# Patient Record
Sex: FEMALE | Race: WHITE | Employment: UNEMPLOYED | URBAN - METROPOLITAN AREA
[De-identification: names, ages, dates, MRNs, and addresses within clinical notes are randomized per-mention and may not be internally consistent; named-entity substitution may affect disease eponyms.]

---

## 2018-11-27 ENCOUNTER — PRE-PROCEDURE TELEPHONE (OUTPATIENT)
Dept: RADIATION ONCOLOGY | Age: 73
End: 2018-11-27

## 2018-12-07 ENCOUNTER — PRE-PROCEDURE TELEPHONE (OUTPATIENT)
Dept: RADIATION ONCOLOGY | Age: 73
End: 2018-12-07

## 2018-12-07 ENCOUNTER — HOSPITAL ENCOUNTER (OUTPATIENT)
Dept: MRI IMAGING | Age: 73
Discharge: HOME | End: 2018-12-07
Payer: MEDICARE

## 2018-12-07 ENCOUNTER — HOSPITAL ENCOUNTER (OUTPATIENT)
Dept: RADIATION ONCOLOGY | Age: 73
Discharge: HOME | End: 2018-12-07
Payer: MEDICARE

## 2018-12-07 ENCOUNTER — HOSPITAL ENCOUNTER (OUTPATIENT)
Dept: CT IMAGING | Age: 73
Discharge: HOME | End: 2018-12-07
Payer: MEDICARE

## 2018-12-07 DIAGNOSIS — D32.9 MENINGIOMA (HCC): ICD-10-CM

## 2018-12-07 PROCEDURE — 70470 CT HEAD/BRAIN W/O & W/DYE: CPT

## 2018-12-07 PROCEDURE — A9576 INJ PROHANCE MULTIPACK: HCPCS | Performed by: INTERNAL MEDICINE

## 2018-12-07 PROCEDURE — 70553 MRI BRAIN STEM W/O & W/DYE: CPT

## 2018-12-07 PROCEDURE — 77290 THER RAD SIMULAJ FIELD CPLX: CPT

## 2018-12-07 PROCEDURE — 6360000004 HC RX CONTRAST MEDICATION: Performed by: NEUROLOGICAL SURGERY

## 2018-12-07 PROCEDURE — 77334 RADIATION TREATMENT AID(S): CPT

## 2018-12-07 PROCEDURE — 6360000004 HC RX CONTRAST MEDICATION: Performed by: INTERNAL MEDICINE

## 2018-12-07 RX ADMIN — IOPAMIDOL 80 ML: 755 INJECTION, SOLUTION INTRAVENOUS at 11:22

## 2018-12-07 RX ADMIN — GADOTERIDOL 20 ML: 279.3 INJECTION, SOLUTION INTRAVENOUS at 12:02

## 2018-12-10 ENCOUNTER — HOSPITAL ENCOUNTER (OUTPATIENT)
Dept: MRI IMAGING | Age: 73
Discharge: HOME | End: 2018-12-10
Payer: MEDICARE

## 2018-12-10 ENCOUNTER — APPOINTMENT (OUTPATIENT)
Dept: MRI IMAGING | Age: 73
End: 2018-12-10
Payer: MEDICARE

## 2018-12-10 DIAGNOSIS — D32.9 MENINGIOMA (HCC): ICD-10-CM

## 2018-12-10 PROCEDURE — A9579 GAD-BASE MR CONTRAST NOS,1ML: HCPCS | Performed by: NEUROLOGICAL SURGERY

## 2018-12-10 PROCEDURE — 70553 MRI BRAIN STEM W/O & W/DYE: CPT

## 2018-12-10 PROCEDURE — 6360000004 HC RX CONTRAST MEDICATION: Performed by: NEUROLOGICAL SURGERY

## 2018-12-10 RX ADMIN — GADOTERIDOL 20 ML: 279.3 INJECTION, SOLUTION INTRAVENOUS at 08:40

## 2018-12-11 ENCOUNTER — HOSPITAL ENCOUNTER (OUTPATIENT)
Dept: RADIATION ONCOLOGY | Age: 73
Discharge: HOME | End: 2018-12-11
Payer: MEDICARE

## 2018-12-11 PROCEDURE — 77373 STRTCTC BDY RAD THER TX DLVR: CPT

## 2018-12-11 NOTE — ONCOLOGY
Fractionated SRS treatment note on the gamma knife machine. Orren Leyden  12/11/18       Patient presents for her first  of 5 treatments on the gamma knife machine for her right parasagital meningioma. She was taken to the gamma knife machine, and her mask was fitted to the machine. Patient confirmation and identification was confirmed using 2 means and a timeout procedure was also done prior to the procedure.     Cone beam CT scan was performed. These images were reviewed in detailed and fused upon her stereotactic reference images. Isodose lines were also reviewed. These were then accepted by myself and our physicist.     Fraction Number: 1  Dose delivered: 5 Gy  Total dose delivered: 5Gy  Planned total dose:  30Gy     The patient acutely tolerated the procedure well. Mask was removed. @ will follow-up when the next appointment has been scheduled. I was present throughout the procedure. Satya Wang MD     Plan reviewed prior to treatment today. I met with pt prior  to procedure. She was pretreated with Ativan 1 mg at home prior to [facility]. On the way here, she had some vague numbness of her left lower extremity. No motor seizure activity, We talked about increasing her decadron (currently 2 mg pobid) and she refuses. Tolerated procedure well but we did stop tx due to some positioning issues.

## 2018-12-11 NOTE — OP NOTE
to confirm set-up accuracy. The patient then underwent stereotactic radiosurgery treatment delivery. Intra-fraction movement was monitored by infrared throughout the procedure. Target Size (cm) Shape Shots Parameters Dose (Gy) Isodose (%)   Rt parietal 3.61 Oval 28 5 Gy x 5 25 50     The patient tolerated the procedure without difficulty. The patient was instructed on medications and the need for follow-up in two weeks. TOTAL TIME SPENT WITH PATIENT:  In conformance with CMS regulations, I affirm that I was present for the entire neurosurgical portion of the procedure including target identification and contouring, development of the treatment plan, patient positioning and verification, and treatment delivery.      Ken Farr MD

## 2018-12-12 ENCOUNTER — HOSPITAL ENCOUNTER (OUTPATIENT)
Dept: RADIATION ONCOLOGY | Age: 73
Discharge: HOME | End: 2018-12-12
Payer: MEDICARE

## 2018-12-12 PROCEDURE — 77373 STRTCTC BDY RAD THER TX DLVR: CPT

## 2018-12-14 ENCOUNTER — HOSPITAL ENCOUNTER (OUTPATIENT)
Dept: RADIATION ONCOLOGY | Age: 73
Discharge: HOME | End: 2018-12-14
Payer: MEDICARE

## 2018-12-14 PROCEDURE — 77373 STRTCTC BDY RAD THER TX DLVR: CPT

## 2018-12-14 NOTE — ONCOLOGY
Fractionated SRS treatment note on the gamma knife machine. Rick Salguero  12/14/18       Patient presents for her 4th of 5 treatments on the gamma knife machine for her meningioma    She was taken to the gamma knife machine, and her mask was fitted to the machine. Patient confirmation and identification was confirmed using 2 means and a timeout procedure was also done prior to the procedure.     Cone beam CT scan was performed. These images were reviewed in detailed and fused upon her stereotactic reference images. Isodose lines were also reviewed. These were then accepted by myself and our physicist.     Fraction Number: 4  Dose delivered: 5 Gy  Total dose delivered: 20Gy  Planned total dose:  25 Gy     The patient acutely tolerated the procedure well. Mask was removed. @ will follow-up when the next appointment has been scheduled. I was present throughout the procedure.     Annie Martinez MD

## 2018-12-17 ENCOUNTER — HOSPITAL ENCOUNTER (OUTPATIENT)
Dept: RADIATION ONCOLOGY | Age: 73
Discharge: HOME | End: 2018-12-17
Payer: MEDICARE

## 2018-12-17 PROCEDURE — 77336 RADIATION PHYSICS CONSULT: CPT

## 2018-12-17 PROCEDURE — 77373 STRTCTC BDY RAD THER TX DLVR: CPT

## 2018-12-17 NOTE — PROGRESS NOTES
Patient in today for fraction 5/5 of Gamma Knife. She tolerated the treatment well. She was seen by both Dr. Juan Manuel Miles and Dr. Jaylin Cam. I reviewed the patient's discharge instructions with her and her cousin. She understands that she has a follow up appointment with Dr. Juan Manuel Miles on 1/3/19 and she should remain on Keppra and Dexamethasone as prescribed until then. Patient was provided a printed copy of all discharge information. I encouraged her to call with any questions or concerns.     Darcy Khan RN

## 2018-12-17 NOTE — PROGRESS NOTES
Fractionated SRS treatment note on the gamma knife machine. Paul Brar  12/17/18       Patient presents for her 5th of 5 treatments on the gamma knife machine for her Right Parietal meningioma. She was taken to the gamma knife machine, and her mask was fitted to the machine. Patient confirmation and identification was confirmed using 2 means and a timeout procedure was also done prior to the procedure.     Cone beam CT scan was performed. These images were reviewed in detailed and fused upon her stereotactic reference images. Isodose lines were also reviewed. These were then accepted by myself and our physicist.     Fraction Number: 5  Dose delivered: 6 Gy  Total dose delivered: 30 Gy  Planned total dose:  30 Gy     The patient acutely tolerated the procedure well. Mask was removed. @ will follow-up when the next appointment has been scheduled. I was present throughout the procedure.     David Araiza MD

## 2019-03-21 ENCOUNTER — HOSPITAL ENCOUNTER (OUTPATIENT)
Dept: MRI IMAGING | Age: 74
Discharge: HOME | End: 2019-03-21
Payer: MEDICARE

## 2019-03-21 DIAGNOSIS — D32.0 BENIGN NEOPLASM OF CEREBRAL MENINGES (HCC): ICD-10-CM

## 2019-03-21 PROCEDURE — A9579 GAD-BASE MR CONTRAST NOS,1ML: HCPCS | Performed by: NURSE PRACTITIONER

## 2019-03-21 PROCEDURE — 6360000004 HC RX CONTRAST MEDICATION: Performed by: NURSE PRACTITIONER

## 2019-03-21 PROCEDURE — 70553 MRI BRAIN STEM W/O & W/DYE: CPT

## 2019-03-21 RX ADMIN — GADOTERIDOL 20 ML: 279.3 INJECTION, SOLUTION INTRAVENOUS at 11:29

## 2019-06-04 ENCOUNTER — HOSPITAL ENCOUNTER (OUTPATIENT)
Dept: MRI IMAGING | Age: 74
Discharge: HOME | End: 2019-06-04
Payer: MEDICARE

## 2019-06-04 DIAGNOSIS — D32.0 CEREBRAL MENINGIOMA (HCC): ICD-10-CM

## 2019-06-04 LAB
GFR AFRICAN AMERICAN: >60
GFR NON-AFRICAN AMERICAN: >60
PERFORMED ON: NORMAL
POC CREATININE: 0.8 MG/DL (ref 0.6–1.2)
POC SAMPLE TYPE: NORMAL

## 2019-06-04 PROCEDURE — 70553 MRI BRAIN STEM W/O & W/DYE: CPT

## 2019-06-04 PROCEDURE — 82565 ASSAY OF CREATININE: CPT

## 2019-06-04 PROCEDURE — A9579 GAD-BASE MR CONTRAST NOS,1ML: HCPCS | Performed by: NEUROLOGICAL SURGERY

## 2019-06-04 PROCEDURE — 6360000004 HC RX CONTRAST MEDICATION: Performed by: NEUROLOGICAL SURGERY

## 2019-06-04 RX ADMIN — GADOTERIDOL 20 ML: 279.3 INJECTION, SOLUTION INTRAVENOUS at 15:25

## 2019-09-03 ENCOUNTER — HOSPITAL ENCOUNTER (OUTPATIENT)
Dept: MRI IMAGING | Age: 74
Discharge: HOME | End: 2019-09-03
Payer: MEDICARE

## 2019-09-03 DIAGNOSIS — D32.9 BENIGN MENINGIOMA (HCC): ICD-10-CM

## 2019-09-03 PROCEDURE — A9579 GAD-BASE MR CONTRAST NOS,1ML: HCPCS | Performed by: NEUROLOGICAL SURGERY

## 2019-09-03 PROCEDURE — 6360000004 HC RX CONTRAST MEDICATION: Performed by: NEUROLOGICAL SURGERY

## 2019-09-03 PROCEDURE — 70553 MRI BRAIN STEM W/O & W/DYE: CPT

## 2019-09-03 RX ADMIN — GADOTERIDOL 19 ML: 279.3 INJECTION, SOLUTION INTRAVENOUS at 12:12
